# Patient Record
Sex: MALE | Race: WHITE | NOT HISPANIC OR LATINO
[De-identification: names, ages, dates, MRNs, and addresses within clinical notes are randomized per-mention and may not be internally consistent; named-entity substitution may affect disease eponyms.]

---

## 2021-02-17 ENCOUNTER — TRANSCRIPTION ENCOUNTER (OUTPATIENT)
Age: 61
End: 2021-02-17

## 2023-05-27 ENCOUNTER — NON-APPOINTMENT (OUTPATIENT)
Age: 63
End: 2023-05-27

## 2023-06-01 ENCOUNTER — TRANSCRIPTION ENCOUNTER (OUTPATIENT)
Age: 63
End: 2023-06-01

## 2023-06-01 ENCOUNTER — APPOINTMENT (OUTPATIENT)
Dept: ORTHOPEDIC SURGERY | Facility: CLINIC | Age: 63
End: 2023-06-01
Payer: COMMERCIAL

## 2023-06-01 VITALS — BODY MASS INDEX: 33.6 KG/M2 | HEIGHT: 71 IN | WEIGHT: 240 LBS

## 2023-06-01 DIAGNOSIS — I10 ESSENTIAL (PRIMARY) HYPERTENSION: ICD-10-CM

## 2023-06-01 DIAGNOSIS — E78.00 PURE HYPERCHOLESTEROLEMIA, UNSPECIFIED: ICD-10-CM

## 2023-06-01 DIAGNOSIS — Z78.9 OTHER SPECIFIED HEALTH STATUS: ICD-10-CM

## 2023-06-01 PROBLEM — Z00.00 ENCOUNTER FOR PREVENTIVE HEALTH EXAMINATION: Status: ACTIVE | Noted: 2023-06-01

## 2023-06-01 PROCEDURE — 99203 OFFICE O/P NEW LOW 30 MIN: CPT

## 2023-06-01 PROCEDURE — 73010 X-RAY EXAM OF SHOULDER BLADE: CPT | Mod: LT

## 2023-06-01 PROCEDURE — 73030 X-RAY EXAM OF SHOULDER: CPT | Mod: LT

## 2023-06-01 RX ORDER — METHYLPREDNISOLONE 4 MG/1
4 TABLET ORAL
Qty: 1 | Refills: 0 | Status: ACTIVE | COMMUNITY
Start: 2023-06-01 | End: 1900-01-01

## 2023-06-01 NOTE — ASSESSMENT
[FreeTextEntry1] : 63YO MALE WITH ATRAUMATIC L SHOULDER PAIN \par XRAYS WITH LARGE CALCIUM DEPOSIT IN SUBSCAP, SCATTERED DEPOSITS THROUGHOUT SUPRA\par DO NOT THINK IN OFFICE CSI WOULD BE BENEFICIAL ENOUGH GIVEN SIZE OF DEPOSITS\par MDP RX, D/C MELOXICAM/NSAIDS WHILE TAKING, PREDIABETIC DISCUSSED IMPLICATIONS\par RX FOR ASP/INJ WITH DR. RYAN AT [facility]\par RTO 6 WEEKS, DISCUSSED SURGICAL INTERVENTION FOR REFRACTORY CASES

## 2023-06-01 NOTE — HISTORY OF PRESENT ILLNESS
[Localized] : localized [Throbbing] : throbbing [Constant] : constant [Household chores] : household chores [Leisure] : leisure [Rest] : rest [Meds] : meds [5] : 5 [de-identified] : 6/1/23: PATIENT WITH L SHOULDER PAIN X 2.5 WEEKS. NO INJURY OR TRAUMA. WENT OT UC AND WAS DX W/ BURSITIS AND GIVEN MELOXICAM WITHOUT RELIEF.  [] : Post Surgical Visit: no [FreeTextEntry1] : LT shoulder [FreeTextEntry5] : 61 yo LHD M here for left shoulder eval. Pain began 1.5 weeks ago with no specific injury. No prior injuries or surgeries. Pt went to UC on 5/28/23, Dx with Bursitis, and Tx with Meloxicam which gave no relief.  [FreeTextEntry9] : Advil [de-identified] : activity [de-identified] : 5/28/23 [de-identified] : UC [de-identified] : none

## 2023-06-01 NOTE — IMAGING
[Left] : left shoulder [Calcific density] : Calcific density [There are no fractures, subluxations or dislocations. No significant abnormalities are seen] : There are no fractures, subluxations or dislocations. No significant abnormalities are seen [de-identified] : LEFT SHOULDER\par No swelling, no ecchymosis, no deformity, no scapular winging.\par anterior and lateral ttp\par Forward flexion to 150; external rotation to 80 degrees (with shoulder abducted); internal rotation to 50 degrees (with shoulder abducted) PAIN WITH ROM, GUARDED\par 5/5 supraspinatus, infraspinatus and subscapularis WITH PAIN\par poaitive Dela Cruz test, positive impingement sign, negative O’Jamison test.\par Speed’s and yergason negative\par Motor and sensory intact distally\par

## 2023-06-06 ENCOUNTER — RESULT REVIEW (OUTPATIENT)
Age: 63
End: 2023-06-06

## 2023-07-06 ENCOUNTER — NON-APPOINTMENT (OUTPATIENT)
Age: 63
End: 2023-07-06

## 2023-07-20 ENCOUNTER — APPOINTMENT (OUTPATIENT)
Dept: ORTHOPEDIC SURGERY | Facility: CLINIC | Age: 63
End: 2023-07-20
Payer: COMMERCIAL

## 2023-07-20 VITALS — WEIGHT: 233 LBS | HEIGHT: 71 IN | BODY MASS INDEX: 32.62 KG/M2

## 2023-07-20 DIAGNOSIS — M75.32 CALCIFIC TENDINITIS OF LEFT SHOULDER: ICD-10-CM

## 2023-07-20 PROCEDURE — 99213 OFFICE O/P EST LOW 20 MIN: CPT

## 2023-07-20 NOTE — ASSESSMENT
[FreeTextEntry1] : His symptoms have essentially resolved after the formal lavage and barbotage with the radiologist.  We had a long discussion about the long-term prognosis chance of recurrence and chance of this occurring on the contralateral shoulder.  He will contact me if any new issues arise.

## 2023-07-20 NOTE — HISTORY OF PRESENT ILLNESS
[5] : 5 [Localized] : localized [Throbbing] : throbbing [Constant] : constant [Household chores] : household chores [Leisure] : leisure [Rest] : rest [Meds] : meds [8] : 8 [de-identified] : 6/1/23: PATIENT WITH L SHOULDER PAIN X 2.5 WEEKS. NO INJURY OR TRAUMA. WENT OT UC AND WAS DX W/ BURSITIS AND GIVEN MELOXICAM WITHOUT RELIEF.  [] : Post Surgical Visit: no [FreeTextEntry1] : LT shoulder [FreeTextEntry5] : Pt is here for follow up visit for the left shoulder. States shoulder pain has greatly improved after having US guided steroid injection/asp on 6/6/23. However he now has increased pain in the left scapula area. He cannot recall any injury.  [FreeTextEntry9] : Advil [de-identified] : activity [de-identified] : 5/28/23 [de-identified] : US guided steroid injection/aspiration [de-identified] : UC

## 2023-07-20 NOTE — IMAGING
[de-identified] : No swelling, no ecchymosis, no deformity, no scapular winging.\par No tenderness to palpation over shoulder, AC joint or trapezius.\par Forward flexion to 180 degrees; external rotation to 90 degrees (with shoulder abducted); internal rotation to 70 degrees (with shoulder abducted); extension 60 degrees;  adduction 30 degrees.\par 5/5 supraspinatus, infraspinatus and subscapularis\par Negative Dela Cruz test, negative impingement sign, negative O’Jamison test.\par Speed’s and yergason negative\par Motor and sensory intact distally\par

## 2023-08-09 ENCOUNTER — APPOINTMENT (OUTPATIENT)
Dept: ORTHOPEDIC SURGERY | Facility: CLINIC | Age: 63
End: 2023-08-09

## 2023-11-29 ENCOUNTER — APPOINTMENT (OUTPATIENT)
Dept: ORTHOPEDIC SURGERY | Facility: CLINIC | Age: 63
End: 2023-11-29
Payer: COMMERCIAL

## 2023-11-29 PROCEDURE — 99213 OFFICE O/P EST LOW 20 MIN: CPT

## 2023-11-29 PROCEDURE — 73140 X-RAY EXAM OF FINGER(S): CPT | Mod: RT

## 2023-12-22 ENCOUNTER — APPOINTMENT (OUTPATIENT)
Age: 63
End: 2023-12-22
Payer: COMMERCIAL

## 2023-12-22 PROCEDURE — 26115 EXC HAND LES SC < 1.5 CM: CPT | Mod: F6

## 2023-12-22 RX ORDER — OXYCODONE AND ACETAMINOPHEN 5; 325 MG/1; MG/1
5-325 TABLET ORAL
Qty: 5 | Refills: 0 | Status: ACTIVE | COMMUNITY
Start: 2023-12-22 | End: 1900-01-01

## 2024-01-03 ENCOUNTER — APPOINTMENT (OUTPATIENT)
Dept: ORTHOPEDIC SURGERY | Facility: CLINIC | Age: 64
End: 2024-01-03
Payer: COMMERCIAL

## 2024-01-03 DIAGNOSIS — R22.31 LOCALIZED SWELLING, MASS AND LUMP, RIGHT UPPER LIMB: ICD-10-CM

## 2024-01-03 PROCEDURE — 99024 POSTOP FOLLOW-UP VISIT: CPT

## 2024-01-03 NOTE — PHYSICAL EXAM
[de-identified] : right index finger Well-approximated dorsal longitudinal incision at the PIP joint, sutures in place Full painless finger ROM Able make painless composite fist nvi

## 2024-01-03 NOTE — HISTORY OF PRESENT ILLNESS
[de-identified] : DOS: 12/22/2023 R index finger dorsal mass excision  1/3/24: The patient returns today for routine postoperative evaluation now 2 weeks s/p right index finger dorsal mass excision.  He is doing well with no complaints.  He is quite happy with his outcome.  11/29/2023 SKYE CARUSO is here for Location: right index finger  Complaint:  He is a right hand dominant male with chronic mass on the dorsal aspect of his right index digit. He had seen another hand surgeon who was scheduled to excise it but they were not on par with his insurance. He states it is not painful but does limit flexion and function. It has not significantly changed in size. Onset: years ago  Prior treatments: None Hand dominance: LEFT  Occupation: retired from [employer], works part-time [employer]  PMH: HTN, prediabetic

## 2024-01-11 ENCOUNTER — NON-APPOINTMENT (OUTPATIENT)
Age: 64
End: 2024-01-11

## 2024-02-06 ENCOUNTER — APPOINTMENT (OUTPATIENT)
Dept: ORTHOPEDIC SURGERY | Facility: CLINIC | Age: 64
End: 2024-02-06

## 2024-06-04 ENCOUNTER — APPOINTMENT (OUTPATIENT)
Dept: ORTHOPEDIC SURGERY | Facility: CLINIC | Age: 64
End: 2024-06-04
Payer: COMMERCIAL

## 2024-06-04 VITALS — WEIGHT: 220 LBS | BODY MASS INDEX: 30.8 KG/M2 | HEIGHT: 71 IN

## 2024-06-04 DIAGNOSIS — M54.42 LUMBAGO WITH SCIATICA, LEFT SIDE: ICD-10-CM

## 2024-06-04 PROCEDURE — 73502 X-RAY EXAM HIP UNI 2-3 VIEWS: CPT

## 2024-06-04 PROCEDURE — 99214 OFFICE O/P EST MOD 30 MIN: CPT

## 2024-06-04 RX ORDER — MELOXICAM 15 MG/1
15 TABLET ORAL
Qty: 30 | Refills: 0 | Status: ACTIVE | COMMUNITY
Start: 2024-06-04 | End: 1900-01-01

## 2024-06-04 NOTE — HISTORY OF PRESENT ILLNESS
[6] : 6 [1] : 2 [Dull/Aching] : dull/aching [Constant] : constant [Walking] : walking [de-identified] : 6.4.24 NEW PATIENT HERE FOR LEFT HIP. PAIN STARTED MONTHS AGO, NO INJURY [] : no [FreeTextEntry9] : [facility]

## 2024-06-04 NOTE — ASSESSMENT
[FreeTextEntry1] : 63 year M WITH MODERATE LT HIP PAIN. PAIN IS TO THE LATERAL ASPECT OF THE HIP AND RADIATES DOWN THE LE. PAIN WORSENS WITH PROLONGED STANDING AND SITTING TO STAND. PAIN IS AFFECTING FUNCTIONAL ACTIVITIES. THERE IS NO REPRODUCIBLE GROIN PAIN ON PHYSICAL EXAM. XRAYS REVIEWED WITH LUMBAR OA. TREATMENT OPTIONS REVIEWED. LUMBAR PT RX. IF SYMPTOMS PERSIST WILL ORDER LUMBAR MRI AND FOLLOW UP WITH PAIN MANAGEMENT. QUESTIONS ANSWERED.

## 2024-07-05 ENCOUNTER — RX RENEWAL (OUTPATIENT)
Age: 64
End: 2024-07-05

## 2024-07-16 ENCOUNTER — APPOINTMENT (OUTPATIENT)
Dept: ORTHOPEDIC SURGERY | Facility: CLINIC | Age: 64
End: 2024-07-16

## 2024-08-02 ENCOUNTER — RX RENEWAL (OUTPATIENT)
Age: 64
End: 2024-08-02

## 2025-09-04 ENCOUNTER — NON-APPOINTMENT (OUTPATIENT)
Age: 65
End: 2025-09-04